# Patient Record
Sex: MALE | Race: WHITE | Employment: OTHER | URBAN - METROPOLITAN AREA
[De-identification: names, ages, dates, MRNs, and addresses within clinical notes are randomized per-mention and may not be internally consistent; named-entity substitution may affect disease eponyms.]

---

## 2018-02-06 PROCEDURE — 84153 ASSAY OF PSA TOTAL: CPT | Performed by: INTERNAL MEDICINE

## 2018-02-21 PROBLEM — I77.810 AORTIC ROOT DILATATION (HCC): Status: ACTIVE | Noted: 2018-02-21

## 2019-03-19 PROBLEM — E66.01 SEVERE OBESITY (BMI 35.0-35.9 WITH COMORBIDITY) (HCC): Status: ACTIVE | Noted: 2019-03-19

## 2019-05-01 ENCOUNTER — ANESTHESIA EVENT (OUTPATIENT)
Dept: ENDOSCOPY | Facility: HOSPITAL | Age: 74
End: 2019-05-01
Payer: MEDICARE

## 2019-05-02 ENCOUNTER — HOSPITAL ENCOUNTER (OUTPATIENT)
Facility: HOSPITAL | Age: 74
Setting detail: HOSPITAL OUTPATIENT SURGERY
Discharge: HOME | End: 2019-05-02
Attending: INTERNAL MEDICINE | Admitting: INTERNAL MEDICINE
Payer: MEDICARE

## 2019-05-02 ENCOUNTER — ANESTHESIA (OUTPATIENT)
Dept: ENDOSCOPY | Facility: HOSPITAL | Age: 74
End: 2019-05-02
Payer: MEDICARE

## 2019-05-02 VITALS
HEART RATE: 51 BPM | BODY MASS INDEX: 35.35 KG/M2 | RESPIRATION RATE: 18 BRPM | DIASTOLIC BLOOD PRESSURE: 73 MMHG | OXYGEN SATURATION: 98 % | SYSTOLIC BLOOD PRESSURE: 112 MMHG | HEIGHT: 72 IN | TEMPERATURE: 98 F | WEIGHT: 261 LBS

## 2019-05-02 DIAGNOSIS — D12.2 ADENOMATOUS POLYP OF ASCENDING COLON: ICD-10-CM

## 2019-05-02 DIAGNOSIS — R19.8 CHANGE IN BOWEL FUNCTION: ICD-10-CM

## 2019-05-02 DIAGNOSIS — Z80.0 FAMILY HISTORY OF MALIGNANT NEOPLASM OF COLON: ICD-10-CM

## 2019-05-02 PROCEDURE — 0DBE8ZX EXCISION OF LARGE INTESTINE, VIA NATURAL OR ARTIFICIAL OPENING ENDOSCOPIC, DIAGNOSTIC: ICD-10-PCS | Performed by: INTERNAL MEDICINE

## 2019-05-02 PROCEDURE — 88305 TISSUE EXAM BY PATHOLOGIST: CPT | Performed by: INTERNAL MEDICINE

## 2019-05-02 RX ORDER — SODIUM CHLORIDE, SODIUM LACTATE, POTASSIUM CHLORIDE, CALCIUM CHLORIDE 600; 310; 30; 20 MG/100ML; MG/100ML; MG/100ML; MG/100ML
INJECTION, SOLUTION INTRAVENOUS CONTINUOUS
Status: DISCONTINUED | OUTPATIENT
Start: 2019-05-02 | End: 2019-05-02

## 2019-05-02 RX ORDER — NALOXONE HYDROCHLORIDE 0.4 MG/ML
80 INJECTION, SOLUTION INTRAMUSCULAR; INTRAVENOUS; SUBCUTANEOUS AS NEEDED
Status: DISCONTINUED | OUTPATIENT
Start: 2019-05-02 | End: 2019-05-02

## 2019-05-02 RX ORDER — ONDANSETRON 2 MG/ML
4 INJECTION INTRAMUSCULAR; INTRAVENOUS AS NEEDED
Status: DISCONTINUED | OUTPATIENT
Start: 2019-05-02 | End: 2019-05-02

## 2019-05-02 NOTE — H&P
REFERRING PHYSICIAN:  Clara Anderson  Primary Care Provider :  Kell Clarke MD        HPI:  Delcie Riedel is a 72year old male. 11/14/1945.   Patient presents with:  Diarrhea           Thank you for sending your patient to see me for evaluation of (Other) Mother           CVA, thyroid disease       Family History- GI   None, no hx of colon cancer         Current Medications:     Current Outpatient Medications:  hydrochlorothiazide 25 MG Oral Tab Take 1 tablet (25 mg total) by mouth once daily.  Disp: BUN/CREAT Ratio      10.0 - 20.0 14.0   Sodium      136 - 145 mmol/L 141   Potassium      3.50 - 5.10 mmol/L 4.91   Chloride      98 - 107 mmol/L 102   Carbon Dioxide, Total      22.0 - 29.0 mmol/L 27.2   CALCIUM      8.6 - 10.0 mg/dL 10.0   TOTAL PROTEI

## 2019-05-02 NOTE — OPERATIVE REPORT
[address] [facility] OPERATIVE REPORT   PATIENT NAME: Cynthia Garcia  MRN: [id number]  DATE OF OPERATION: 5/2/2019  PREOPERATIVE DIAGNOSIS:   1.  Diarrhea   POSTOPERATIVE DIAGNOSES:  sigmoid colon diverticulosis  small internal hemorrh RECOMMENDATIONS:    Await pathology    Merline Mcfadden MD

## 2019-05-02 NOTE — ANESTHESIA PREPROCEDURE EVALUATION
PRE-OP EVALUATION    Patient Name: Monae Nolasco    Pre-op Diagnosis: Change in bowel function [R19.8]  Family history of malignant neoplasm of colon [Z80.0]  Adenomatous polyp of ascending colon [D12.2]    Procedure(s):  COLONOSCOPY    Surgeon(s) an hypothyroidism                       Pulmonary                    (+) sleep apnea and noncompliant      Neuro/Psych      (+) depression  (+) anxiety                            Past Surgical History:   Procedure Laterality Date   • COLONOSCOPY  12/11/2012

## 2019-05-02 NOTE — DISCHARGE SUMMARY
Outpatient Surgery Brief Discharge Summary         Patient ID:  Felipa Hendrix  [id number]  72year old  11/14/1945    Discharge Diagnoses: Hemorrhoids, Diverticulosis    Procedures: colonoscopy   Discharged Condition: stable    Disposition: home    Pa

## 2019-05-02 NOTE — ANESTHESIA POSTPROCEDURE EVALUATION
[address] [facility]. Patient Status:  Hospital Outpatient Surgery   Age/Gender 72year old male MRN [id number]   Location [facility]. Attending Flor Arnold MD   Hosp Day # 0 PCP Aidee Belle DO       Anesthesia Post-op

## 2019-05-07 NOTE — PROGRESS NOTES
5/7/2019  [id number] [facility] [id number]    Dear Jordan Rajput,       Here are the  biopsy/pathology findings from your recent Colonoscopy :    a normal biopsy, no evidence of inflammation or colitis.       Follow-up information:

## 2019-05-14 PROCEDURE — 86480 TB TEST CELL IMMUN MEASURE: CPT | Performed by: DERMATOLOGY

## 2019-05-14 PROCEDURE — 80074 ACUTE HEPATITIS PANEL: CPT | Performed by: DERMATOLOGY

## 2020-01-27 PROBLEM — F32.1 CURRENT MODERATE EPISODE OF MAJOR DEPRESSIVE DISORDER WITHOUT PRIOR EPISODE (HCC): Status: ACTIVE | Noted: 2020-01-27

## 2020-02-17 PROBLEM — M17.12 TRICOMPARTMENT OSTEOARTHRITIS OF LEFT KNEE: Status: ACTIVE | Noted: 2020-02-17

## 2020-03-03 PROBLEM — I77.810 AORTIC ROOT DILATATION (HCC): Status: RESOLVED | Noted: 2018-02-21 | Resolved: 2020-03-03

## 2020-05-11 PROBLEM — R73.01 IFG (IMPAIRED FASTING GLUCOSE): Status: ACTIVE | Noted: 2020-05-11

## 2020-05-11 PROBLEM — I10 BENIGN ESSENTIAL HTN: Status: ACTIVE | Noted: 2020-05-11

## 2020-06-11 PROBLEM — E78.2 MIXED HYPERLIPIDEMIA: Status: ACTIVE | Noted: 2020-06-11

## 2020-06-15 PROBLEM — R73.03 PREDIABETES: Status: ACTIVE | Noted: 2020-06-15

## 2020-06-15 PROBLEM — E78.1 HYPERTRIGLYCERIDEMIA: Status: ACTIVE | Noted: 2020-06-15

## 2020-06-15 PROBLEM — F31.9 BIPOLAR 1 DISORDER (HCC): Status: ACTIVE | Noted: 2020-06-15

## 2020-06-15 PROBLEM — E88.81 METABOLIC SYNDROME: Status: ACTIVE | Noted: 2020-06-15

## 2020-07-29 PROBLEM — Z79.899 ENCOUNTER FOR DRUG THERAPY: Status: ACTIVE | Noted: 2020-07-29

## 2020-07-29 PROBLEM — M15.9 PRIMARY OSTEOARTHRITIS INVOLVING MULTIPLE JOINTS: Status: ACTIVE | Noted: 2020-07-29

## 2020-08-06 PROBLEM — D84.9 IMMUNOSUPPRESSION (HCC): Status: ACTIVE | Noted: 2020-08-06

## 2020-08-30 PROBLEM — E66.01 SEVERE OBESITY (BMI 35.0-35.9 WITH COMORBIDITY) (HCC): Status: RESOLVED | Noted: 2019-03-19 | Resolved: 2020-08-30

## 2021-03-24 PROBLEM — D84.821 IMMUNODEFICIENCY DUE TO LONG TERM DRUG THERAPY (HCC): Status: ACTIVE | Noted: 2020-08-06

## 2021-03-24 PROBLEM — Z79.899 IMMUNODEFICIENCY DUE TO LONG TERM DRUG THERAPY (HCC): Status: ACTIVE | Noted: 2020-08-06

## (undated) DEVICE — 1200CC GUARDIAN II: Brand: GUARDIAN

## (undated) DEVICE — FORCEP BIOPSY RJ4 LG CAP W/ND

## (undated) DEVICE — 3M™ RED DOT™ MONITORING ELECTRODE WITH FOAM TAPE AND STICKY GEL, 50/BAG, 20/CASE, 72/PLT 2570: Brand: RED DOT™

## (undated) DEVICE — Device: Brand: DEFENDO AIR/WATER/SUCTION AND BIOPSY VALVE

## (undated) DEVICE — FILTERLINE NASAL ADULT O2/CO2

## (undated) DEVICE — ENDOSCOPY PACK - LOWER: Brand: MEDLINE INDUSTRIES, INC.

## (undated) NOTE — LETTER
5/7/2019          [id number] [facility] [id number]    Dear Pooja Baker,       Here are the  biopsy/pathology findings from your recent Colonoscopy :    a normal biopsy, no evidence of inflammation or colitis.       Follow-up in